# Patient Record
Sex: MALE | URBAN - METROPOLITAN AREA
[De-identification: names, ages, dates, MRNs, and addresses within clinical notes are randomized per-mention and may not be internally consistent; named-entity substitution may affect disease eponyms.]

---

## 2019-08-10 ENCOUNTER — APPOINTMENT (OUTPATIENT)
Dept: CT IMAGING | Age: 39
End: 2019-08-10
Attending: STUDENT IN AN ORGANIZED HEALTH CARE EDUCATION/TRAINING PROGRAM
Payer: SELF-PAY

## 2019-08-10 ENCOUNTER — HOSPITAL ENCOUNTER (EMERGENCY)
Age: 39
Discharge: LEFT BEFORE DISCHARGE | End: 2019-08-10
Attending: STUDENT IN AN ORGANIZED HEALTH CARE EDUCATION/TRAINING PROGRAM
Payer: SELF-PAY

## 2019-08-10 VITALS — HEART RATE: 113 BPM | RESPIRATION RATE: 20 BRPM | SYSTOLIC BLOOD PRESSURE: 141 MMHG | DIASTOLIC BLOOD PRESSURE: 68 MMHG

## 2019-08-10 DIAGNOSIS — Z76.5 DRUG-SEEKING BEHAVIOR: ICD-10-CM

## 2019-08-10 DIAGNOSIS — R53.1 ACUTE LEFT-SIDED WEAKNESS: Primary | ICD-10-CM

## 2019-08-10 DIAGNOSIS — D57.00 SICKLE CELL PAIN CRISIS (HCC): ICD-10-CM

## 2019-08-10 LAB
GLUCOSE BLD STRIP.AUTO-MCNC: 160 MG/DL (ref 65–100)
SERVICE CMNT-IMP: ABNORMAL

## 2019-08-10 PROCEDURE — 74011250636 HC RX REV CODE- 250/636: Performed by: EMERGENCY MEDICINE

## 2019-08-10 PROCEDURE — 82962 GLUCOSE BLOOD TEST: CPT

## 2019-08-10 PROCEDURE — 99282 EMERGENCY DEPT VISIT SF MDM: CPT

## 2019-08-10 RX ORDER — HYDROMORPHONE HYDROCHLORIDE 2 MG/ML
1 INJECTION, SOLUTION INTRAMUSCULAR; INTRAVENOUS; SUBCUTANEOUS ONCE
Status: COMPLETED | OUTPATIENT
Start: 2019-08-10 | End: 2019-08-10

## 2019-08-10 RX ADMIN — HYDROMORPHONE HYDROCHLORIDE 1 MG: 2 INJECTION INTRAMUSCULAR; INTRAVENOUS; SUBCUTANEOUS at 15:24

## 2019-08-10 NOTE — ED PROVIDER NOTES
Patient arrives stating, \"I'm having another stroke on my left side\", c/o left arm and leg numbness and jaw numbness x2 hours PTA. He further states, \"my tongue is going now too, it's hard for me to talk. \"  He endorses Hx of stroke while living in [place] with right-sided deficits at that time. He also endorses Hx sickle cell anemia. No EtOH or illicit drug use PTA, per patient. I have evaluated the patient as the Provider in Triage. I have reviewed his vital signs and the triage nurse assessment. I have talked with the patient and any available family and advised that I am the provider in triage and have ordered the appropriate study to initiate their work up based on the clinical presentation during my assessment. I have advised that the patient will be accommodated in the Main ED as soon as possible. I have also requested to contact the triage nurse or myself immediately if the patient experiences any changes in their condition during this brief waiting period. Note written by Vincent Banuelos, as dictated by Debbie Huerta MD 3:09 PM    46 y.o. male with past medical history significant for stroke and sickle cell anemia who presents from private vehicle with chief complaint of numbness. Pt reports left sided upper and lower extremity numbness, accompanied by left sided facial numbness, tongue numbness, and headache that began approximately 2 hours ago. There are no other acute medical concerns at this time. Note written by Vincent Knight, as dictated by Danica Vance DO 3:10 PM                   No past medical history on file. No past surgical history on file. No family history on file.     Social History     Socioeconomic History    Marital status: Not on file     Spouse name: Not on file    Number of children: Not on file    Years of education: Not on file    Highest education level: Not on file   Occupational History    Not on file   Social Needs    Financial resource strain: Not on file    Food insecurity:     Worry: Not on file     Inability: Not on file    Transportation needs:     Medical: Not on file     Non-medical: Not on file   Tobacco Use    Smoking status: Not on file   Substance and Sexual Activity    Alcohol use: Not on file    Drug use: Not on file    Sexual activity: Not on file   Lifestyle    Physical activity:     Days per week: Not on file     Minutes per session: Not on file    Stress: Not on file   Relationships    Social connections:     Talks on phone: Not on file     Gets together: Not on file     Attends Jew service: Not on file     Active member of club or organization: Not on file     Attends meetings of clubs or organizations: Not on file     Relationship status: Not on file    Intimate partner violence:     Fear of current or ex partner: Not on file     Emotionally abused: Not on file     Physically abused: Not on file     Forced sexual activity: Not on file   Other Topics Concern    Not on file   Social History Narrative    Not on file         ALLERGIES: Patient has no allergy information on record. Review of Systems   Constitutional: Negative for activity change, appetite change, chills and fever. HENT: Negative for congestion, rhinorrhea, sinus pain, sneezing and sore throat. Eyes: Negative for photophobia and visual disturbance. Respiratory: Negative for cough and shortness of breath. Cardiovascular: Negative for chest pain. Gastrointestinal: Negative for abdominal pain, blood in stool, constipation, diarrhea, nausea and vomiting. Genitourinary: Negative for difficulty urinating, dysuria, flank pain, hematuria, penile pain and testicular pain. Musculoskeletal: Negative for arthralgias, back pain, myalgias and neck pain. Skin: Negative for rash and wound. Neurological: Positive for numbness and headaches. Negative for syncope and light-headedness.    Psychiatric/Behavioral: Negative for self-injury and suicidal ideas. All other systems reviewed and are negative. Vitals:    08/10/19 1510   BP: 141/68   Pulse: (!) 113   Resp: 20            Physical Exam   Constitutional: He is oriented to person, place, and time. He appears well-developed and well-nourished. No distress. Uncomfortable appearing. Mildly agitated and aggressive. HENT:   Head: Normocephalic and atraumatic. Eyes: Pupils are equal, round, and reactive to light. Conjunctivae and EOM are normal.   EOM intact. Normal pupils. Neck: Neck supple. Cardiovascular: Normal rate, regular rhythm and normal heart sounds. Pulmonary/Chest: Effort normal and breath sounds normal.   Abdominal: Soft. He exhibits no distension. There is no tenderness. Abdomen soft, non tender, and non distended. Musculoskeletal: He exhibits no edema or tenderness. Neurological: He is alert and oriented to person, place, and time. GCS eye subscore is 4. GCS verbal subscore is 5. GCS motor subscore is 6. Slurred speech without clear facial droop. 3/5 strength of the left upper extremity with positive pronator drift. Decreased sensation throughout left hemibody. 3/5 strength of left lower extremity. 5/5 strength of the right upper and lower extremity with intact sensation. GCS 15. Skin: Skin is warm and dry. He is not diaphoretic. Nursing note and vitals reviewed. Note written by Vincent Thompson, as dictated by No att. providers found 3:10 PM       MDM     46 y.o. male with reported history of sickle cell and prior stroke without residual deficit presents to the ED noting headache and acute onset of left sided numbness and weakness beginning approximately 2 hours ago. Exam initially concerning for CVA. Code S level 1 was called and Pt was transported directly to scanner. Upon arriving to CT scanner he was able to transfer himself to table but refused to lay down and began c/o new diffuse back pain along the spine.  He notes severe pain precluding his ability to lay flat and cooperate with CT scan despite the fact he was sitting back in a wheel chair leaning against back rest. Pt requested 6 mg dilaudid IM and 50 mg benadryl and states that is his normal pain management regimen. Feel that is a dangerously high dose of medication and it is not within my practice to give doses this high. Given significant concern for his well being with left sided weakness concerning for stroke, compromise was made to give a dose of IM dilaudid, but I would only give him 1 mg to treat sickle cell pain. Pt then became very agitated when he saw the nurse holding the syringe and deduced judging by volume in the syringe that it was not his requested 6 mg stating \"that is only 1mg\". The more agitated he became the clearer his speech became. The Pt then refused to cooperate with CT scanning and demanded to leave AMA. Pt then stood up from wheel chair and ambulated away initially with a limp that gradually improved with distance. Stable gait. He was seen while ambulating away using his left arm that he was not using previously stating that it was acutely weak. Feel that he is making a very poor decision if he is indeed having a stroke and I would love to assess him for a stroke with CT scanning and CVA evaluation. However, I feel that there is a strong component of drug seeking behavior to his presentation. His request for intramuscular injection of very high doses of pain medication does not make sense, as it would not take affect as quickly as he states it would given limited/gradual absorption IM. He states that he is travelling from [facility] with family though no family is present with him on his presentation or upon his exit from the hospital. Suspect that he is malingering and is just trying to obtain strong doses of pain medication as his primary concern is treatment of his pain with far less concern for his reported new left sided weakness.  D/t concerns of safety of staff and others in the department with escalating aggression with his language with nursing and security staff, he was allowed to ambulate on his own volition with steady gait, strongly against medical advice, out of the department  refusing to cooperate with any further testing or evaluation. Procedures    PROGRESS NOTE:  3:32 PM  Pt refused CT and left the ED AMA.

## 2019-08-10 NOTE — ED NOTES
To help the patient complete the required CT scan for the \"Code S\" protocol, I went into the radiology department to medicate him with 1 mg of IM dilaudid for pain (ordered by Dr. Tanisha Troncoso). Upon entering the room, the patient immediately asked me \"how much you got there, man\". He then noticed that I was holding a 2 mg / 1 ml vial and started to become very agitated. \"I'm not doing the scan until I get my 6 mg of dilaudid and benadryl. This is bullshit. None of you know what it's like to have sickle cell\". Dr. Tanisha Troncoso tried repetitively to address the patient's needs, while highlighting the importance of the CT scan for a proper diagnosis. 1 mg of dilaudid was administered, but the patient refused to complete any diagnostic testing, and perseverated on pain medication being his utmost concern. He limped out of the department refusing to sign AMA paperwork. He was verbally informed of his risk of death and deterioration by multiple staff members prior to leaving.

## 2019-08-10 NOTE — ED NOTES
Code ATLAS called on patient in CT. Patient refusing CT scan until pain meds given. Dr. Hooks Fees aware.

## 2019-08-10 NOTE — ED TRIAGE NOTES
Pt states \"I'm having another stroke\". Reports that he had one in [place]. Pt reports numbness on left side and \"My tongue is going numb, I cant feel my jaw, have a bad headache that started 2 hours ago, my led is numb\". Pt reports having sickle cell disease.  Dr. Bety Sheehan called code stroke level 1 in triage, pt is VAN +